# Patient Record
Sex: FEMALE | Race: WHITE | NOT HISPANIC OR LATINO
[De-identification: names, ages, dates, MRNs, and addresses within clinical notes are randomized per-mention and may not be internally consistent; named-entity substitution may affect disease eponyms.]

---

## 2022-12-30 PROBLEM — Z00.00 ENCOUNTER FOR PREVENTIVE HEALTH EXAMINATION: Status: ACTIVE | Noted: 2022-12-30

## 2023-01-04 ENCOUNTER — TRANSCRIPTION ENCOUNTER (OUTPATIENT)
Age: 23
End: 2023-01-04

## 2023-01-04 ENCOUNTER — APPOINTMENT (OUTPATIENT)
Dept: GASTROENTEROLOGY | Facility: CLINIC | Age: 23
End: 2023-01-04
Payer: COMMERCIAL

## 2023-01-04 VITALS
HEIGHT: 64 IN | HEART RATE: 74 BPM | TEMPERATURE: 97.5 F | DIASTOLIC BLOOD PRESSURE: 74 MMHG | WEIGHT: 176 LBS | OXYGEN SATURATION: 98 % | SYSTOLIC BLOOD PRESSURE: 110 MMHG | BODY MASS INDEX: 30.05 KG/M2

## 2023-01-04 DIAGNOSIS — E53.8 DEFICIENCY OF OTHER SPECIFIED B GROUP VITAMINS: ICD-10-CM

## 2023-01-04 DIAGNOSIS — Z78.9 OTHER SPECIFIED HEALTH STATUS: ICD-10-CM

## 2023-01-04 DIAGNOSIS — D50.9 IRON DEFICIENCY ANEMIA, UNSPECIFIED: ICD-10-CM

## 2023-01-04 DIAGNOSIS — F41.9 ANXIETY DISORDER, UNSPECIFIED: ICD-10-CM

## 2023-01-04 DIAGNOSIS — F32.A ANXIETY DISORDER, UNSPECIFIED: ICD-10-CM

## 2023-01-04 DIAGNOSIS — K62.5 HEMORRHAGE OF ANUS AND RECTUM: ICD-10-CM

## 2023-01-04 DIAGNOSIS — R14.0 ABDOMINAL DISTENSION (GASEOUS): ICD-10-CM

## 2023-01-04 DIAGNOSIS — Z83.79 FAMILY HISTORY OF OTHER DISEASES OF THE DIGESTIVE SYSTEM: ICD-10-CM

## 2023-01-04 PROCEDURE — 36415 COLL VENOUS BLD VENIPUNCTURE: CPT

## 2023-01-04 PROCEDURE — 99205 OFFICE O/P NEW HI 60 MIN: CPT | Mod: 25

## 2023-01-04 RX ORDER — ESCITALOPRAM OXALATE 10 MG/1
10 TABLET, FILM COATED ORAL
Refills: 0 | Status: ACTIVE | COMMUNITY

## 2023-01-04 RX ORDER — LEVONORGESTREL AND ETHINYL ESTRADIOL 0.15-0.03
KIT ORAL
Refills: 0 | Status: ACTIVE | COMMUNITY

## 2023-01-07 PROBLEM — E53.8 VITAMIN B12 DEFICIENCY: Status: ACTIVE | Noted: 2023-01-07

## 2023-01-07 PROBLEM — D50.9 ANEMIA, IRON DEFICIENCY: Status: ACTIVE | Noted: 2023-01-07

## 2023-01-07 NOTE — REASON FOR VISIT
[Initial Evaluation] : an initial evaluation [FreeTextEntry1] : Bloating, abdominal distention, irregular bowel habits, nausea, vomiting, rectal bleeding, iron deficiency anemia, B12 deficiency

## 2023-01-07 NOTE — HISTORY OF PRESENT ILLNESS
[FreeTextEntry1] : The patient is a 22-year-old woman who has had longstanding "stomach problems".  She states that she has had constant nausea, bloating with diffuse abdominal pain, and abdominal distention.  She had vomiting beginning at age 14, specifically with fried foods.  She went to an allergist because of the vomiting and was told that she was allergic to gluten along with casein whey and wheat.  She has been gluten-free and dairy free for 8 years.  In regards to the gluten-free diet, the patient never cheats but does not avoid contact and cross-contamination.  She states that the symptoms are better than they were before but are all still present.  She now complains of constant nausea.  She vomits 1-2 times a month.  She has frequent heartburn and takes Tums multiple times a day.  She has occasional dysphagia in the throat with eating gluten-free bread and pasta.  She states that her bowel movements are irregular.  They vary in her occasionally diarrhea and loose.  The stools were generally not solid.  She sometimes skips a day but usually has 2-3 bowel movements a day.  Sometimes she has more.  She has had recent bright red blood on the toilet paper.  She denies melena.  The patient has gained weight and had gained 30 pounds earlier in the year.  Her weight is recently been stable despite efforts to lose.  She goes to the gym daily and sees a nutritionist.\par \par Of note, the patient has been told that she is anemic with low ferritin and low B12.  She received 3 iron infusions in October 2022.  She has been on oral vitamin B12 for 3 to 4 months.  She also had mono in 2018 and 18 and had splenomegaly.  She most recently had an ultrasound on August 9, 2022 which showed that the spleen size had decreased.  The gallbladder was normal.  The patient has not been admitted to the hospital in the past year and denies any cardiac issues.

## 2023-01-07 NOTE — CONSULT LETTER
[FreeTextEntry1] : Dear Dr. Maria E Flores,\par \par I had the pleasure of seeing your patient BOLA ALMONTE in the office today.  My office note is attached. PLEASE READ THE "ASSESSMENT" SECTION OF THE NOTE TO SEE MY IMPRESSION AND PLAN.\par \par Thank you very much for allowing me to participate in the care of your patient.\par \par Sincerely,\par \par Alcides Terrell M.D., FACG, FACP\par Director, Celiac Program at [facility]\par  of Medicine, [facility] [employer] [facility] [employer] at [facility] Adjunct  of Medicine, [facility] of [facility] Practice Director, [facility] - Gastroenterology at [place]\[facility] [address]\[place] [address]\par Tel: [phone]\par Email: [email or web address]\par \par \par The attached note has been created using a voice recognition system (Dragon).  There may be some misspellings and typos.  Please call my office if you have any issues or questions.

## 2023-01-07 NOTE — ASSESSMENT
[FreeTextEntry1] : Patient with longstanding GI symptoms.  She complains of bloating, abdominal distention, constant nausea, vomiting, frequent heartburn, irregular bowel movements which are frequently loose, and occasional rectal bleeding.  She is on a mostly gluten-free diet but continues to have the symptoms.  Multiple etiologies need to be considered.  She has had iron deficiency anemia and received 3 iron infusions and also was told that she had low B12.\par \par Bloodwork was sent for CBC, chem-pack, TSH, celiac markers, iron studies, vitamin B12, folate, vitamin A, vitamin D, vitamin K, magnesium, carotene, vitamin B6, zinc, copper, vitamin B1, vitamin B2, selenium, celiac HLA testing, IBD serologies, sed rate, C-reactive protein.\par \par We discussed the need to consider celiac disease.  The patient was advised that she must go back on a gluten-containing diet eating at least the equivalent of 2 slices of bread a day.  She will be on this diet for at least 6 to 8 weeks and then will undergo EGD and colonoscopy.  An EGD and colonoscopy have been scheduled. The risks, benefits, alternatives, and limitations of the procedures, including the possibility of missed lesions, were explained.\par \par Patient was sent for a HIDA scan to rule out biliary dyskinesia.\par \par

## 2023-01-16 ENCOUNTER — NON-APPOINTMENT (OUTPATIENT)
Age: 23
End: 2023-01-16

## 2023-01-16 LAB
25(OH)D3 SERPL-MCNC: 49.7 NG/ML
ALBUMIN SERPL ELPH-MCNC: 4.3 G/DL
ALP BLD-CCNC: 78 U/L
ALT SERPL-CCNC: 24 U/L
ANION GAP SERPL CALC-SCNC: 17 MMOL/L
ANTI-A4 FLA2 IGG ELISA: 47.5 EU/ML
ANTI-CBIR1 ELISA: 74.2 EU/ML
ANTI-FLAX IGG ELISA: 49 EU/ML
ANTI-OMPC IGA ELISA: < 3.1 EU/ML
ASCA IGA ELISA: < 3.1 EU/ML
ASCA IGG ELISA: < 3.1 EU/ML
AST SERPL-CCNC: 18 U/L
ATG16L1 SNP (RS2241880): NORMAL
BASOPHILS # BLD AUTO: 0.08 K/UL
BASOPHILS NFR BLD AUTO: 0.9 %
BETA CAROTENE: 26 UG/DL
BILIRUB SERPL-MCNC: 0.5 MG/DL
BUN SERPL-MCNC: 12 MG/DL
CALCIUM SERPL-MCNC: 10 MG/DL
CHLORIDE SERPL-SCNC: 104 MMOL/L
CO2 SERPL-SCNC: 17 MMOL/L
COPPER SERPL-MCNC: 135 UG/DL
CREAT SERPL-MCNC: 0.68 MG/DL
CRP PROMTHEUS: 1.5 MG/L
CRP SERPL-MCNC: <3 MG/L
ECM1 SNP (RS3737240): NORMAL
EGFR: 125 ML/MIN/1.73M2
ENDOMYSIUM IGA SER QL: NEGATIVE
ENDOMYSIUM IGA TITR SER: NORMAL
EOSINOPHIL # BLD AUTO: 0.25 K/UL
EOSINOPHIL NFR BLD AUTO: 2.9 %
ERYTHROCYTE [SEDIMENTATION RATE] IN BLOOD BY WESTERGREN METHOD: 9 MM/HR
FERRITIN SERPL-MCNC: 203 NG/ML
FOLATE SERPL-MCNC: 5.9 NG/ML
GGT SERPL-CCNC: 28 U/L
GLIADIN IGA SER QL: <5 UNITS
GLIADIN IGG SER QL: <5 UNITS
GLIADIN PEPTIDE IGA SER-ACNC: NEGATIVE
GLIADIN PEPTIDE IGG SER-ACNC: NEGATIVE
GLUCOSE SERPL-MCNC: 93 MG/DL
HCT VFR BLD CALC: 46.4 %
HGB BLD-MCNC: 15.9 G/DL
IBD AB 7 PNL SER: NORMAL
IBD-SPECIFIC PANCA IFA PATTERN DNASE SENSITIVITY: NOT DETECTED
IBD-SPECIFIC PANCA IFA PERINUCLEAR PATTERN: NOT DETECTED
ICAM-1 PROMETHEUS: 0.5 UG/ML
IGA SER QL IEP: 120 MG/DL
IMM GRANULOCYTES NFR BLD AUTO: 0.2 %
IRON SATN MFR SERPL: 53 %
IRON SERPL-MCNC: 249 UG/DL
LYMPHOCYTES # BLD AUTO: 2.1 K/UL
LYMPHOCYTES NFR BLD AUTO: 24.8 %
MAGNESIUM SERPL-MCNC: 1.7 MG/DL
MAN DIFF?: NORMAL
MCHC RBC-ENTMCNC: 32.6 PG
MCHC RBC-ENTMCNC: 34.3 GM/DL
MCV RBC AUTO: 95.1 FL
MENADIONE SERPL-MCNC: 0.9 NG/ML
MONOCYTES # BLD AUTO: 0.48 K/UL
MONOCYTES NFR BLD AUTO: 5.7 %
NEUTROPHILS # BLD AUTO: 5.55 K/UL
NEUTROPHILS NFR BLD AUTO: 65.5 %
NKX2-3 SNP (RS10883365): NORMAL
PLATELET # BLD AUTO: 302 K/UL
POTASSIUM SERPL-SCNC: 4.4 MMOL/L
PROMETHEUS CELIAC GENETICS: NORMAL
PROMETHEUS LABORATORY FOOTER: NORMAL
PROT SERPL-MCNC: 6.9 G/DL
RBC # BLD: 4.88 M/UL
RBC # FLD: 12.5 %
SAA PROMETHEUS: 6.4 MG/L
SELENIUM SERPL-MCNC: 131 UG/L
SODIUM SERPL-SCNC: 138 MMOL/L
STAT3 SNP (RS744166): NORMAL
TIBC SERPL-MCNC: 470 UG/DL
TSH SERPL-ACNC: 2.11 UIU/ML
TTG IGA SER IA-ACNC: <1.2 U/ML
TTG IGA SER-ACNC: NEGATIVE
TTG IGG SER IA-ACNC: <1.2 U/ML
TTG IGG SER IA-ACNC: NEGATIVE
UIBC SERPL-MCNC: 221 UG/DL
VCAM-1 PROMETHEUS: 0.5 UG/ML
VEGF PROMETHEUS: 184 PG/ML
VIT A SERPL-MCNC: 101.4 UG/DL
VIT B1 SERPL-MCNC: 128.4 NMOL/L
VIT B12 SERPL-MCNC: 832 PG/ML
VIT B2 SERPL-MCNC: 229 UG/L
VIT B6 SERPL-MCNC: 8.3 UG/L
WBC # FLD AUTO: 8.48 K/UL
ZINC SERPL-MCNC: 80 UG/DL

## 2023-01-17 ENCOUNTER — NON-APPOINTMENT (OUTPATIENT)
Age: 23
End: 2023-01-17

## 2023-02-27 ENCOUNTER — APPOINTMENT (OUTPATIENT)
Dept: NUCLEAR MEDICINE | Facility: CLINIC | Age: 23
End: 2023-02-27
Payer: COMMERCIAL

## 2023-02-27 ENCOUNTER — OUTPATIENT (OUTPATIENT)
Dept: OUTPATIENT SERVICES | Facility: HOSPITAL | Age: 23
LOS: 1 days | End: 2023-02-27

## 2023-02-27 DIAGNOSIS — R11.2 NAUSEA WITH VOMITING, UNSPECIFIED: ICD-10-CM

## 2023-02-27 PROCEDURE — 78227 HEPATOBIL SYST IMAGE W/DRUG: CPT | Mod: 26

## 2023-02-28 ENCOUNTER — NON-APPOINTMENT (OUTPATIENT)
Age: 23
End: 2023-02-28

## 2023-04-12 ENCOUNTER — APPOINTMENT (OUTPATIENT)
Dept: GASTROENTEROLOGY | Facility: AMBULATORY MEDICAL SERVICES | Age: 23
End: 2023-04-12
Payer: COMMERCIAL

## 2023-04-12 PROCEDURE — 45380 COLONOSCOPY AND BIOPSY: CPT

## 2023-04-12 PROCEDURE — 43239 EGD BIOPSY SINGLE/MULTIPLE: CPT | Mod: 59

## 2023-04-13 ENCOUNTER — NON-APPOINTMENT (OUTPATIENT)
Age: 23
End: 2023-04-13

## 2023-04-14 ENCOUNTER — NON-APPOINTMENT (OUTPATIENT)
Age: 23
End: 2023-04-14

## 2023-04-14 LAB
ENDOMYSIUM IGA SER QL: NEGATIVE
GLIADIN IGA SER QL: <5 UNITS
GLIADIN IGG SER QL: <5 UNITS
GLIADIN PEPTIDE IGA SER-ACNC: NEGATIVE
GLIADIN PEPTIDE IGG SER-ACNC: NEGATIVE
IGA SER QL IEP: 120 MG/DL
TTG IGA SER IA-ACNC: <1.2 U/ML
TTG IGA SER-ACNC: NEGATIVE
TTG IGG SER IA-ACNC: <1.2 U/ML
TTG IGG SER IA-ACNC: NEGATIVE

## 2023-05-02 ENCOUNTER — NON-APPOINTMENT (OUTPATIENT)
Age: 23
End: 2023-05-02

## 2023-05-16 ENCOUNTER — APPOINTMENT (OUTPATIENT)
Dept: GASTROENTEROLOGY | Facility: CLINIC | Age: 23
End: 2023-05-16
Payer: COMMERCIAL

## 2023-05-16 DIAGNOSIS — R12 HEARTBURN: ICD-10-CM

## 2023-05-16 DIAGNOSIS — R19.7 DIARRHEA, UNSPECIFIED: ICD-10-CM

## 2023-05-16 PROCEDURE — 99215 OFFICE O/P EST HI 40 MIN: CPT | Mod: 95

## 2023-05-16 RX ORDER — HYDROCORTISONE 2.5% 25 MG/G
2.5 CREAM TOPICAL
Qty: 1 | Refills: 2 | Status: ACTIVE | COMMUNITY
Start: 2023-05-16 | End: 1900-01-01

## 2023-05-17 NOTE — CONSULT LETTER
[FreeTextEntry1] : Dear Dr. Maria E Flores,\par \par I had the pleasure of seeing your patient BOLA ALMONTE in the office today.  My office note is attached. PLEASE READ THE "ASSESSMENT" SECTION OF THE NOTE TO SEE MY IMPRESSION AND PLAN.\par \par Thank you very much for allowing me to participate in the care of your patient.\par \par Sincerely,\par \par Alcides Terrell M.D., FACG, FACP\par Director, Celiac Program at [facility]\par  of Medicine, [facility] [employer] at [facility] Adjunct  of Medicine, [facility] Practice Director, [facility] - Gastroenterology at [place]\[facility] [address]\[place] [address]\par Tel: [phone]\par Email: [email or web address]\par \par \par The attached note has been created using a voice recognition system (Dragon).  There may be some misspellings and typos.  Please call my office if you have any issues or questions.

## 2023-05-17 NOTE — HISTORY OF PRESENT ILLNESS
[Home] : at home, [unfilled] , at the time of the visit. [Medical Office: ([facility])___] : at the medical office located in  [Verbal consent obtained from patient] : the patient, [unfilled] [FreeTextEntry1] : This was a televideo visit.  We reviewed the evaluations done since the patient's last visit on January 4, 2023.  Blood work from that time revealed normal celiac blood tests and negative celiac genetic testing, making the likelihood of celiac disease extremely unlikely.  Blood work was normal other than a positive IBD first step consistent with Crohn's disease.  The patient had a HIDA scan performed on February 27, 2023 which showed an ejection fraction of 92%.  This raised the question of the possibility of gallbladder hyperkinesia.  The patient underwent EGD and colonoscopy on April 12, 2023.  This was performed on a gluten-containing diet.  EGD was grossly normal with negative small bowel biopsies.  Biopsies from the esophagus however revealed elevated eosinophils with up to 28 eosinophils at the GE junction and 15 at the distal esophagus and midesophagus.  Colonoscopy was normal other than external hemorrhoids which are symptomatic and give the patient pain and burning.  Random right and the left colonic biopsies were negative.  The patient does note dysphagia to solids, usually with breads and grains but also with rice, meat, and chicken.  She has to drink fluids to get the food down.  She does not regurgitate the food.  She also has daily heartburn for which she takes an excessive amount of Tums.  She has remained on a gluten-containing diet.  She has had lower abdominal pain over the past week.  She also notes diarrhea with 3-6 bowel movements a day.  She denies melena or bright red blood per rectum.  The patient's weight is stable.\par \par \par Note from 1/4/2023 - The patient is a 22-year-old woman who has had longstanding "stomach problems".  She states that she has had constant nausea, bloating with diffuse abdominal pain, and abdominal distention.  She had vomiting beginning at age 14, specifically with fried foods.  She went to an allergist because of the vomiting and was told that she was allergic to gluten along with casein whey and wheat.  She has been gluten-free and dairy free for 8 years.  In regards to the gluten-free diet, the patient never cheats but does not avoid contact and cross-contamination.  She states that the symptoms are better than they were before but are all still present.  She now complains of constant nausea.  She vomits 1-2 times a month.  She has frequent heartburn and takes Tums multiple times a day.  She has occasional dysphagia in the throat with eating gluten-free bread and pasta.  She states that her bowel movements are irregular.  They vary in her occasionally diarrhea and loose.  The stools were generally not solid.  She sometimes skips a day but usually has 2-3 bowel movements a day.  Sometimes she has more.  She has had recent bright red blood on the toilet paper.  She denies melena.  The patient has gained weight and had gained 30 pounds earlier in the year.  Her weight is recently been stable despite efforts to lose.  She goes to the gym daily and sees a nutritionist.\par \par Of note, the patient has been told that she is anemic with low ferritin and low B12.  She received 3 iron infusions in October 2022.  She has been on oral vitamin B12 for 3 to 4 months.  She also had mono in 2018 and 18 and had splenomegaly.  She most recently had an ultrasound on August 9, 2022 which showed that the spleen size had decreased.  The gallbladder was normal.  The patient has not been admitted to the hospital in the past year and denies any cardiac issues.

## 2023-05-17 NOTE — REASON FOR VISIT
[FreeTextEntry1] : Reflux esophagitis, eosinophilic esophagitis, heartburn, dysphagia, hemorrhoids, diarrhea

## 2023-05-17 NOTE — ASSESSMENT
[FreeTextEntry1] : Patient status post extensive work-up.  Celiac disease has been excluded.  She has elevated eosinophil counts throughout the esophagus which appear to be more consistent with reflux esophagitis than the eosinophilic esophagitis although the patient does have complaints of dysphagia to solids along with daily heartburn.  She is complaining of diarrhea and lower abdominal pain.  She had an IBD first step test consistent with Crohn's disease.  She also has symptomatic hemorrhoids.\par \par The patient was started on pantoprazole 40 mg a day to be taken 30 to 60 minutes before breakfast in the morning.\par \par Patient was given Proctozone 2.5% cream to use as needed for the hemorrhoids.\par \par In order to fully rule out Crohn's disease and evaluate her symptoms, A capsule endoscopy has been scheduled. The risks, benefits, alternatives, and limitations of the procedure were explained.\par \par Stool studies will be sent for a GI infectious PCR panel, C. diff by PCR, calprotectin, and fecal elastase.\par \par Patient will return to see me in 2 months.\par \par \par Plan from 1/4/2023 - Patient with longstanding GI symptoms.  She complains of bloating, abdominal distention, constant nausea, vomiting, frequent heartburn, irregular bowel movements which are frequently loose, and occasional rectal bleeding.  She is on a mostly gluten-free diet but continues to have the symptoms.  Multiple etiologies need to be considered.  She has had iron deficiency anemia and received 3 iron infusions and also was told that she had low B12.\par \par Bloodwork was sent for CBC, chem-pack, TSH, celiac markers, iron studies, vitamin B12, folate, vitamin A, vitamin D, vitamin K, magnesium, carotene, vitamin B6, zinc, copper, vitamin B1, vitamin B2, selenium, celiac HLA testing, IBD serologies, sed rate, C-reactive protein.\par \par We discussed the need to consider celiac disease.  The patient was advised that she must go back on a gluten-containing diet eating at least the equivalent of 2 slices of bread a day.  She will be on this diet for at least 6 to 8 weeks and then will undergo EGD and colonoscopy.  An EGD and colonoscopy have been scheduled. The risks, benefits, alternatives, and limitations of the procedures, including the possibility of missed lesions, were explained.\par \par Patient was sent for a HIDA scan to rule out biliary dyskinesia.\par \par

## 2023-06-08 ENCOUNTER — NON-APPOINTMENT (OUTPATIENT)
Age: 23
End: 2023-06-08

## 2023-06-13 ENCOUNTER — APPOINTMENT (OUTPATIENT)
Dept: GASTROENTEROLOGY | Facility: CLINIC | Age: 23
End: 2023-06-13
Payer: COMMERCIAL

## 2023-06-13 ENCOUNTER — NON-APPOINTMENT (OUTPATIENT)
Age: 23
End: 2023-06-13

## 2023-06-13 PROCEDURE — 91110 GI TRC IMG INTRAL ESOPH-ILE: CPT

## 2023-06-14 ENCOUNTER — NON-APPOINTMENT (OUTPATIENT)
Age: 23
End: 2023-06-14

## 2023-07-06 ENCOUNTER — APPOINTMENT (OUTPATIENT)
Dept: GASTROENTEROLOGY | Facility: CLINIC | Age: 23
End: 2023-07-06
Payer: COMMERCIAL

## 2023-07-06 DIAGNOSIS — K64.4 RESIDUAL HEMORRHOIDAL SKIN TAGS: ICD-10-CM

## 2023-07-06 DIAGNOSIS — R14.0 ABDOMINAL DISTENSION (GASEOUS): ICD-10-CM

## 2023-07-06 PROCEDURE — 99214 OFFICE O/P EST MOD 30 MIN: CPT | Mod: 95

## 2023-07-06 NOTE — ASSESSMENT
[FreeTextEntry1] : Patient with reflux esophagitis versus eosinophilic esophagitis.  Her heartburn is completely resolved on pantoprazole 40 mg a day but she continues to have dysphagia to solids which sounds more consistent with eosinophilic esophagitis.  She has constant abdominal bloating and pain.  She also has stools that vary from diarrhea to solid stools.\par \par Stool studies will be sent for a GI infectious PCR panel, C. diff by PCR, calprotectin, and fecal elastase.\par \par Patient will be sent for breath testing to rule out SIBO.\par \par Patient will continue pantoprazole 40 mg a day.\par \par We will plan on repeating an EGD in April 2024 to reassess the eosinophil count.  If she has ongoing dysphagia and esophageal eosinophilia, we would then pursue other treatment such as Dupixent or budesonide slurry.\par \par \par Plan from 5/16/2023 - Patient status post extensive work-up.  Celiac disease has been excluded.  She has elevated eosinophil counts throughout the esophagus which appear to be more consistent with reflux esophagitis than the eosinophilic esophagitis although the patient does have complaints of dysphagia to solids along with daily heartburn.  She is complaining of diarrhea and lower abdominal pain.  She had an IBD first step test consistent with Crohn's disease.  She also has symptomatic hemorrhoids.\par \par The patient was started on pantoprazole 40 mg a day to be taken 30 to 60 minutes before breakfast in the morning.\par \par Patient was given Proctozone 2.5% cream to use as needed for the hemorrhoids.\par \par In order to fully rule out Crohn's disease and evaluate her symptoms, A capsule endoscopy has been scheduled. The risks, benefits, alternatives, and limitations of the procedure were explained.\par \par Stool studies will be sent for a GI infectious PCR panel, C. diff by PCR, calprotectin, and fecal elastase.\par \par Patient will return to see me in 2 months.\par \par \par Plan from 1/4/2023 - Patient with longstanding GI symptoms.  She complains of bloating, abdominal distention, constant nausea, vomiting, frequent heartburn, irregular bowel movements which are frequently loose, and occasional rectal bleeding.  She is on a mostly gluten-free diet but continues to have the symptoms.  Multiple etiologies need to be considered.  She has had iron deficiency anemia and received 3 iron infusions and also was told that she had low B12.\par \par Bloodwork was sent for CBC, chem-pack, TSH, celiac markers, iron studies, vitamin B12, folate, vitamin A, vitamin D, vitamin K, magnesium, carotene, vitamin B6, zinc, copper, vitamin B1, vitamin B2, selenium, celiac HLA testing, IBD serologies, sed rate, C-reactive protein.\par \par We discussed the need to consider celiac disease.  The patient was advised that she must go back on a gluten-containing diet eating at least the equivalent of 2 slices of bread a day.  She will be on this diet for at least 6 to 8 weeks and then will undergo EGD and colonoscopy.  An EGD and colonoscopy have been scheduled. The risks, benefits, alternatives, and limitations of the procedures, including the possibility of missed lesions, were explained.\par \par Patient was sent for a HIDA scan to rule out biliary dyskinesia.\par \par

## 2023-07-06 NOTE — HISTORY OF PRESENT ILLNESS
[Home] : at home, [unfilled] , at the time of the visit. [Medical Office: ([facility])___] : at the medical office located in  [Verbal consent obtained from patient] : the patient, [unfilled] [FreeTextEntry1] : This was a televideo visit.  The patient has endoscopic biopsy findings consistent with reflux esophagitis versus eosinophilic esophagitis.  Since the patient's last visit on May 16, 2023, the patient had a negative capsule endoscopy on 2/13/2023.  She has been on pantoprazole 40 mg a day and states that the medication is "great, a life saver".  She has no heartburn on the medication and has had no need for Tums.  She does note that solid food gets held up in her upper chest and she needs to drink water to pass it.  She has not noticed any change in this symptom on pantoprazole.  She denies regurgitation or.  She denies any nausea or vomiting except for a 2-hour.  While in [place] it seems like a self-limited illness.  Her stools vary and range from diarrhea to solid stools.  She denies melena.  She does see bright red blood on the toilet paper for which she uses Proctozone.  She complains of constant bloating and abdominal pain which feels slightly better when she avoids gluten.  She is considering going back on a gluten-free diet.  She is completely dairy free.\par \par \par Note from 5/16/2023 - This was a televideo visit.  We reviewed the evaluations done since the patient's last visit on January 4, 2023.  Blood work from that time revealed normal celiac blood tests and negative celiac genetic testing, making the likelihood of celiac disease extremely unlikely.  Blood work was normal other than a positive IBD first step consistent with Crohn's disease.  The patient had a HIDA scan performed on February 27, 2023 which showed an ejection fraction of 92%.  This raised the question of the possibility of gallbladder hyperkinesia.  The patient underwent EGD and colonoscopy on April 12, 2023.  This was performed on a gluten-containing diet.  EGD was grossly normal with negative small bowel biopsies.  Biopsies from the esophagus however revealed elevated eosinophils with up to 28 eosinophils at the GE junction and 15 at the distal esophagus and midesophagus.  Colonoscopy was normal other than external hemorrhoids which are symptomatic and give the patient pain and burning.  Random right and the left colonic biopsies were negative.  The patient does note dysphagia to solids, usually with breads and grains but also with rice, meat, and chicken.  She has to drink fluids to get the food down.  She does not regurgitate the food.  She also has daily heartburn for which she takes an excessive amount of Tums.  She has remained on a gluten-containing diet.  She has had lower abdominal pain over the past week.  She also notes diarrhea with 3-6 bowel movements a day.  She denies melena or bright red blood per rectum.  The patient's weight is stable.\par \par \par Note from 1/4/2023 - The patient is a 22-year-old woman who has had longstanding "stomach problems".  She states that she has had constant nausea, bloating with diffuse abdominal pain, and abdominal distention.  She had vomiting beginning at age 14, specifically with fried foods.  She went to an allergist because of the vomiting and was told that she was allergic to gluten along with casein whey and wheat.  She has been gluten-free and dairy free for 8 years.  In regards to the gluten-free diet, the patient never cheats but does not avoid contact and cross-contamination.  She states that the symptoms are better than they were before but are all still present.  She now complains of constant nausea.  She vomits 1-2 times a month.  She has frequent heartburn and takes Tums multiple times a day.  She has occasional dysphagia in the throat with eating gluten-free bread and pasta.  She states that her bowel movements are irregular.  They vary in her occasionally diarrhea and loose.  The stools were generally not solid.  She sometimes skips a day but usually has 2-3 bowel movements a day.  Sometimes she has more.  She has had recent bright red blood on the toilet paper.  She denies melena.  The patient has gained weight and had gained 30 pounds earlier in the year.  Her weight is recently been stable despite efforts to lose.  She goes to the gym daily and sees a nutritionist.\par \par Of note, the patient has been told that she is anemic with low ferritin and low B12.  She received 3 iron infusions in October 2022.  She has been on oral vitamin B12 for 3 to 4 months.  She also had mono in 2018 and 18 and had splenomegaly.  She most recently had an ultrasound on August 9, 2022 which showed that the spleen size had decreased.  The gallbladder was normal.  The patient has not been admitted to the hospital in the past year and denies any cardiac issues.

## 2023-07-06 NOTE — CONSULT LETTER
[FreeTextEntry1] : Dear Dr. Maria E Flores,\par \par I had the pleasure of seeing your patient BOLA ALMONTE in the office today.  My office note is attached. PLEASE READ THE "ASSESSMENT" SECTION OF THE NOTE TO SEE MY IMPRESSION AND PLAN.\par \par Thank you very much for allowing me to participate in the care of your patient.\par \par Sincerely,\par \par Alcides Terrell M.D., FACG, FACP\par Director, Celiac Program at [facility]\par  of Medicine, [facility] [employer] at [facility] Adjunct  of Medicine, [facility] of [facility] Practice Director, [facility] - Gastroenterology at [place]\[facility] [address]\[place] [address]\par Tel: [phone]\par Email: [email or web address]\par \par \par The attached note has been created using a voice recognition system (Dragon).  There may be some misspellings and typos.  Please call my office if you have any issues or questions.

## 2023-07-06 NOTE — REASON FOR VISIT
[FreeTextEntry1] : Reflux esophagitis, eosinophilic esophagitis, dysphagia, bloating, irregular bowel habits, hemorrhoids

## 2023-07-18 ENCOUNTER — APPOINTMENT (OUTPATIENT)
Dept: GASTROENTEROLOGY | Facility: CLINIC | Age: 23
End: 2023-07-18

## 2023-09-06 ENCOUNTER — NON-APPOINTMENT (OUTPATIENT)
Age: 23
End: 2023-09-06

## 2023-09-06 LAB
CALPROTECTIN FECAL: 16 UG/G
CDIFF BY PCR: NOT DETECTED
GI PCR PANEL: NOT DETECTED
PANCREATIC ELASTASE, FECAL: 420 CD:794062645

## 2023-10-03 ENCOUNTER — NON-APPOINTMENT (OUTPATIENT)
Age: 23
End: 2023-10-03

## 2023-11-26 ENCOUNTER — RX RENEWAL (OUTPATIENT)
Age: 23
End: 2023-11-26

## 2024-05-11 ENCOUNTER — RX RENEWAL (OUTPATIENT)
Age: 24
End: 2024-05-11

## 2024-05-11 RX ORDER — PANTOPRAZOLE 40 MG/1
40 TABLET, DELAYED RELEASE ORAL
Qty: 90 | Refills: 0 | Status: ACTIVE | COMMUNITY
Start: 2023-05-16 | End: 1900-01-01

## 2024-06-25 ENCOUNTER — TRANSCRIPTION ENCOUNTER (OUTPATIENT)
Age: 24
End: 2024-06-25

## 2024-06-25 ENCOUNTER — NON-APPOINTMENT (OUTPATIENT)
Age: 24
End: 2024-06-25

## 2024-06-26 ENCOUNTER — APPOINTMENT (OUTPATIENT)
Dept: GASTROENTEROLOGY | Facility: CLINIC | Age: 24
End: 2024-06-26
Payer: COMMERCIAL

## 2024-06-26 DIAGNOSIS — R19.8 OTHER SPECIFIED SYMPTOMS AND SIGNS INVOLVING THE DIGESTIVE SYSTEM AND ABDOMEN: ICD-10-CM

## 2024-06-26 DIAGNOSIS — R14.3 FLATULENCE: ICD-10-CM

## 2024-06-26 DIAGNOSIS — R13.10 DYSPHAGIA, UNSPECIFIED: ICD-10-CM

## 2024-06-26 DIAGNOSIS — K21.00 GASTRO-ESOPHAGEAL REFLUX DISEASE WITH ESOPHAGITIS, WITHOUT BLEEDING: ICD-10-CM

## 2024-06-26 DIAGNOSIS — R10.32 LEFT LOWER QUADRANT PAIN: ICD-10-CM

## 2024-06-26 DIAGNOSIS — R11.2 NAUSEA WITH VOMITING, UNSPECIFIED: ICD-10-CM

## 2024-06-26 DIAGNOSIS — K20.0 EOSINOPHILIC ESOPHAGITIS: ICD-10-CM

## 2024-06-26 PROCEDURE — 99214 OFFICE O/P EST MOD 30 MIN: CPT

## 2024-06-26 RX ORDER — CALCIUM CARBONATE 500 MG/1
TABLET, CHEWABLE ORAL
Refills: 0 | Status: DISCONTINUED | COMMUNITY
End: 2024-06-26

## 2024-06-26 RX ORDER — CHOLECALCIFEROL (VITAMIN D3) 25 MCG
TABLET ORAL
Refills: 0 | Status: ACTIVE | COMMUNITY

## 2024-06-26 RX ORDER — SODIUM PICOSULFATE, MAGNESIUM OXIDE, AND ANHYDROUS CITRIC ACID 10; 3.5; 12 MG/160ML; G/160ML; G/160ML
10-3.5-12 MG-GM LIQUID ORAL
Qty: 1 | Refills: 0 | Status: DISCONTINUED | COMMUNITY
Start: 2023-01-04 | End: 2024-06-26

## 2024-07-03 ENCOUNTER — OUTPATIENT (OUTPATIENT)
Dept: OUTPATIENT SERVICES | Facility: HOSPITAL | Age: 24
LOS: 1 days | End: 2024-07-03
Payer: COMMERCIAL

## 2024-07-03 ENCOUNTER — APPOINTMENT (OUTPATIENT)
Dept: CT IMAGING | Facility: CLINIC | Age: 24
End: 2024-07-03
Payer: COMMERCIAL

## 2024-07-03 DIAGNOSIS — R19.8 OTHER SPECIFIED SYMPTOMS AND SIGNS INVOLVING THE DIGESTIVE SYSTEM AND ABDOMEN: ICD-10-CM

## 2024-07-03 DIAGNOSIS — R11.2 NAUSEA WITH VOMITING, UNSPECIFIED: ICD-10-CM

## 2024-07-03 DIAGNOSIS — R14.3 FLATULENCE: ICD-10-CM

## 2024-07-03 DIAGNOSIS — K21.00 GASTRO-ESOPHAGEAL REFLUX DISEASE WITH ESOPHAGITIS, WITHOUT BLEEDING: ICD-10-CM

## 2024-07-03 DIAGNOSIS — R10.32 LEFT LOWER QUADRANT PAIN: ICD-10-CM

## 2024-07-03 PROCEDURE — 74177 CT ABD & PELVIS W/CONTRAST: CPT | Mod: 26

## 2024-07-03 PROCEDURE — 74177 CT ABD & PELVIS W/CONTRAST: CPT

## 2024-08-28 ENCOUNTER — TRANSCRIPTION ENCOUNTER (OUTPATIENT)
Age: 24
End: 2024-08-28

## 2024-09-12 ENCOUNTER — RX RENEWAL (OUTPATIENT)
Age: 24
End: 2024-09-12